# Patient Record
Sex: FEMALE | URBAN - METROPOLITAN AREA
[De-identification: names, ages, dates, MRNs, and addresses within clinical notes are randomized per-mention and may not be internally consistent; named-entity substitution may affect disease eponyms.]

---

## 2020-08-28 ENCOUNTER — APPOINTMENT (RX ONLY)
Dept: URBAN - METROPOLITAN AREA CLINIC 41 | Facility: CLINIC | Age: 40
Setting detail: DERMATOLOGY
End: 2020-08-28

## 2020-08-28 DIAGNOSIS — L81.1 CHLOASMA: ICD-10-CM

## 2020-08-28 DIAGNOSIS — D22 MELANOCYTIC NEVI: ICD-10-CM

## 2020-08-28 DIAGNOSIS — L81.2 FRECKLES: ICD-10-CM

## 2020-08-28 DIAGNOSIS — L81.4 OTHER MELANIN HYPERPIGMENTATION: ICD-10-CM

## 2020-08-28 PROBLEM — D22.61 MELANOCYTIC NEVI OF RIGHT UPPER LIMB, INCLUDING SHOULDER: Status: ACTIVE | Noted: 2020-08-28

## 2020-08-28 PROBLEM — D22.5 MELANOCYTIC NEVI OF TRUNK: Status: ACTIVE | Noted: 2020-08-28

## 2020-08-28 PROCEDURE — ? PRESCRIPTION

## 2020-08-28 PROCEDURE — 99202 OFFICE O/P NEW SF 15 MIN: CPT

## 2020-08-28 PROCEDURE — ? TREATMENT REGIMEN

## 2020-08-28 PROCEDURE — ? COUNSELING

## 2020-08-28 ASSESSMENT — LOCATION DETAILED DESCRIPTION DERM
LOCATION DETAILED: RIGHT SUPERIOR LATERAL UPPER BACK
LOCATION DETAILED: RIGHT CENTRAL MALAR CHEEK
LOCATION DETAILED: LEFT DISTAL POSTERIOR UPPER ARM
LOCATION DETAILED: LEFT PROXIMAL ULNAR DORSAL FOREARM
LOCATION DETAILED: LEFT PROXIMAL POSTERIOR UPPER ARM
LOCATION DETAILED: SUPERIOR MID FOREHEAD
LOCATION DETAILED: LEFT POSTERIOR SHOULDER
LOCATION DETAILED: RIGHT POSTERIOR SHOULDER
LOCATION DETAILED: RIGHT INFERIOR UPPER BACK
LOCATION DETAILED: LEFT SUPERIOR UPPER BACK
LOCATION DETAILED: RIGHT PROXIMAL POSTERIOR UPPER ARM
LOCATION DETAILED: RIGHT ANTERIOR PROXIMAL UPPER ARM
LOCATION DETAILED: LEFT ANTERIOR DISTAL UPPER ARM
LOCATION DETAILED: RIGHT VENTRAL PROXIMAL FOREARM
LOCATION DETAILED: LEFT CENTRAL MALAR CHEEK
LOCATION DETAILED: RIGHT PROXIMAL DORSAL FOREARM
LOCATION DETAILED: LEFT VENTRAL PROXIMAL FOREARM

## 2020-08-28 ASSESSMENT — LOCATION SIMPLE DESCRIPTION DERM
LOCATION SIMPLE: RIGHT SHOULDER
LOCATION SIMPLE: LEFT SHOULDER
LOCATION SIMPLE: RIGHT UPPER ARM
LOCATION SIMPLE: LEFT FOREARM
LOCATION SIMPLE: LEFT CHEEK
LOCATION SIMPLE: SUPERIOR FOREHEAD
LOCATION SIMPLE: LEFT POSTERIOR UPPER ARM
LOCATION SIMPLE: RIGHT BACK
LOCATION SIMPLE: LEFT UPPER BACK
LOCATION SIMPLE: RIGHT FOREARM
LOCATION SIMPLE: RIGHT POSTERIOR UPPER ARM
LOCATION SIMPLE: LEFT UPPER ARM
LOCATION SIMPLE: RIGHT UPPER BACK
LOCATION SIMPLE: RIGHT CHEEK

## 2020-08-28 ASSESSMENT — LOCATION ZONE DERM
LOCATION ZONE: ARM
LOCATION ZONE: FACE
LOCATION ZONE: TRUNK

## 2023-04-17 ENCOUNTER — OFFICE VISIT (OUTPATIENT)
Dept: ORTHOPEDIC SURGERY | Age: 43
End: 2023-04-17
Payer: COMMERCIAL

## 2023-04-17 VITALS — WEIGHT: 213 LBS | HEIGHT: 70 IN | BODY MASS INDEX: 30.49 KG/M2

## 2023-04-17 DIAGNOSIS — M47.816 LUMBAR SPONDYLOSIS: Primary | ICD-10-CM

## 2023-04-17 DIAGNOSIS — M51.36 DDD (DEGENERATIVE DISC DISEASE), LUMBAR: ICD-10-CM

## 2023-04-17 PROCEDURE — 99204 OFFICE O/P NEW MOD 45 MIN: CPT | Performed by: PHYSICIAN ASSISTANT

## 2023-04-17 NOTE — PROGRESS NOTES
L3-S1      Motor    Hip Flexion: grossly normal, but somewhat pain limited on the right    Knee Extension:  grossly normal, but somewhat pain limited on the right    Ankle Dorsiflexion: grossly normal on the left, 4+/5 on the right    Great Toe Exension:  grossly normal       Point tenderness: Diffuse tenderness across the lower lumbar spine near the belt line and in the midline. No point tenderness over the greater trochanter of the right hip. Gait: Ambulates with no appreciable limp. Good painless range of motion of the hips. IMAGING:     MRI Result (most recent):  No results found for this or any previous visit from the past 3650 days. AP and lateral views of the lumbar spine reveal good lordosis. Good alignment of the lumbar spine. Mild lumbar spondylosis and degenerative disc disease noted. Possibly some mild lower lumbar facet arthropathy at L5-S1    Interpretation: Lumbar spondylosis degenerative disc disease      ASSESSMENT AND PLAN:     The patient has low back pain with some extension into the right lateral hip. Some intermittent numbness to the right toes is present. I discussed the x-ray findings with the patient. She has mild degenerative changes noted in her lumbar spine x-rays. I discussed the fact that her pain may or may not be related to her lumbar spine. The AP view of the lumbar films seem to show good joint space and normal-appearing femoral heads of the hips, so lower suspicion of an intrinsic hip pathology here. I would recommend a course of anti-inflammatories and physical therapy normally, but given she is trying to avoid these medications and trying to get pregnant we will avoid that for now as well. I did offer a topical Voltaren in its place and recommended she discuss with her OB/GYN whether it is reasonable to take this topically while she is trying to get pregnant.   We will start a course of physical therapy for back and hip exercises and see if she is

## 2023-04-18 ENCOUNTER — TELEPHONE (OUTPATIENT)
Dept: FAMILY MEDICINE CLINIC | Facility: CLINIC | Age: 43
End: 2023-04-18

## 2023-04-18 NOTE — TELEPHONE ENCOUNTER
----- Message from John Peñaloza sent at 4/17/2023  1:38 PM EDT -----  Subject: Results Request    QUESTIONS  Results: urine culture; Ordered by: Clarissa Saeed Performed: 2023-04-14  ---------------------------------------------------------------------------  --------------  Kezia Simmons PWQK    [phone]; OK to leave message on voicemail  ---------------------------------------------------------------------------  --------------

## 2023-04-18 NOTE — TELEPHONE ENCOUNTER
Patient contacted. Let her know provider has been out of the office since Friday afternoon and will not be back until tomorrow afternoon. Let her know that message has been sent to him regarding reviewing lab results. Patient wanted to let provider and office know that she was extremely upset at how things are handled here. States \"No one should have to wait this long to have lab results reviewed and medication sent in.\" Patient reported she will not return as a patient here at this office.

## 2023-04-18 NOTE — TELEPHONE ENCOUNTER
----- Message from Lynne Garcia sent at 4/18/2023 10:22 AM EDT -----  Patient is calling back in regards to urine culture results. Culture was done last week and still has not heard back and believes she has infection she would like to get taken care of. Patient requesting callback today.

## 2023-04-19 ENCOUNTER — TELEPHONE (OUTPATIENT)
Dept: FAMILY MEDICINE CLINIC | Facility: CLINIC | Age: 43
End: 2023-04-19

## 2023-04-19 RX ORDER — SULFAMETHOXAZOLE AND TRIMETHOPRIM 400; 80 MG/1; MG/1
2 TABLET ORAL DAILY
Qty: 40 TABLET | Refills: 0 | Status: SHIPPED | OUTPATIENT
Start: 2023-04-19 | End: 2023-04-29

## 2023-04-19 NOTE — TELEPHONE ENCOUNTER
----- Message from Chantel Patel MD sent at 4/19/2023  8:40 AM EDT -----  Please call patient and report that I have sent a prescription for a urinary tract infection to her drugstore. Advise patient that her urine culture came back while I was on vacation. I am sorry for the delay, I e expected one of my partners would have been notified of this and sent in a prescription for this.   Also please advise that I am obligated to respond to any medical need that she may have for the next 30 days or until she finds a new provider, which ever comes first.

## 2023-05-01 ENCOUNTER — OFFICE VISIT (OUTPATIENT)
Dept: ORTHOPEDIC SURGERY | Age: 43
End: 2023-05-01
Payer: COMMERCIAL

## 2023-05-01 DIAGNOSIS — M77.12 LATERAL EPICONDYLITIS, LEFT ELBOW: ICD-10-CM

## 2023-05-01 DIAGNOSIS — M75.42 IMPINGEMENT SYNDROME OF LEFT SHOULDER: Primary | ICD-10-CM

## 2023-05-01 DIAGNOSIS — M25.512 LEFT SHOULDER PAIN, UNSPECIFIED CHRONICITY: ICD-10-CM

## 2023-05-01 PROCEDURE — 99204 OFFICE O/P NEW MOD 45 MIN: CPT | Performed by: ORTHOPAEDIC SURGERY
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
## 2023-05-08 ENCOUNTER — NURSE ONLY (OUTPATIENT)
Dept: FAMILY MEDICINE CLINIC | Facility: CLINIC | Age: 43
End: 2023-05-08

## 2023-05-08 DIAGNOSIS — R30.0 DYSURIA: Primary | ICD-10-CM

## 2023-05-08 DIAGNOSIS — R77.0 LOW SERUM ALBUMIN: ICD-10-CM

## 2023-05-08 DIAGNOSIS — D64.9 ANEMIA, UNSPECIFIED TYPE: ICD-10-CM

## 2023-05-08 DIAGNOSIS — R30.0 DYSURIA: ICD-10-CM

## 2023-05-09 LAB
ALBUMIN SERPL-MCNC: 3.6 G/DL (ref 3.5–5)
ALBUMIN/GLOB SERPL: 1.1 (ref 0.4–1.6)
ALP SERPL-CCNC: 80 U/L (ref 50–136)
ALT SERPL-CCNC: 21 U/L (ref 12–65)
AST SERPL-CCNC: 14 U/L (ref 15–37)
BACTERIA URNS QL MICRO: NEGATIVE /HPF
BILIRUB DIRECT SERPL-MCNC: 0.1 MG/DL
BILIRUB SERPL-MCNC: 0.5 MG/DL (ref 0.2–1.1)
CASTS URNS QL MICRO: ABNORMAL /LPF (ref 0–2)
EPI CELLS #/AREA URNS HPF: ABNORMAL /HPF (ref 0–5)
GLOBULIN SER CALC-MCNC: 3.3 G/DL (ref 2.8–4.5)
IRON SERPL-MCNC: 43 UG/DL (ref 35–150)
PROT SERPL-MCNC: 6.9 G/DL (ref 6.3–8.2)
RBC #/AREA URNS HPF: >100 /HPF (ref 0–5)
TIBC SERPL-MCNC: 390 UG/DL (ref 250–450)
WBC URNS QL MICRO: ABNORMAL /HPF (ref 0–4)

## 2023-05-11 LAB
BACTERIA SPEC CULT: NORMAL
SERVICE CMNT-IMP: NORMAL

## 2023-05-23 ENCOUNTER — OFFICE VISIT (OUTPATIENT)
Dept: OBGYN CLINIC | Age: 43
End: 2023-05-23
Payer: COMMERCIAL

## 2023-05-23 VITALS
DIASTOLIC BLOOD PRESSURE: 70 MMHG | WEIGHT: 212.6 LBS | BODY MASS INDEX: 30.43 KG/M2 | HEIGHT: 70 IN | SYSTOLIC BLOOD PRESSURE: 124 MMHG

## 2023-05-23 DIAGNOSIS — G44.89 HEADACHE ASSOCIATED WITH HORMONAL FACTORS: ICD-10-CM

## 2023-05-23 DIAGNOSIS — N97.0 ANOVULATION: Primary | ICD-10-CM

## 2023-05-23 PROBLEM — O09.519 AMA (ADVANCED MATERNAL AGE) PRIMIGRAVIDA 35+: Status: RESOLVED | Noted: 2023-05-23 | Resolved: 2023-05-23

## 2023-05-23 PROBLEM — O09.519 AMA (ADVANCED MATERNAL AGE) PRIMIGRAVIDA 35+: Status: ACTIVE | Noted: 2023-05-23

## 2023-05-23 PROCEDURE — 99204 OFFICE O/P NEW MOD 45 MIN: CPT | Performed by: OBSTETRICS & GYNECOLOGY

## 2023-05-23 RX ORDER — LANOLIN ALCOHOL/MO/W.PET/CERES
1000 CREAM (GRAM) TOPICAL DAILY
COMMUNITY

## 2023-05-28 LAB — MIS SERPL-MCNC: 1.11 NG/ML

## 2023-05-30 ENCOUNTER — TELEPHONE (OUTPATIENT)
Dept: OBGYN CLINIC | Age: 43
End: 2023-05-30

## 2023-05-30 NOTE — TELEPHONE ENCOUNTER
Called patient to discuss lab results, no answer. Left patient a message notify her I would like to review lab results and that a [employer] message was sent and pt could answer questions in [email or web address].

## 2023-05-30 NOTE — TELEPHONE ENCOUNTER
----- Message from Yousuf Archer MD sent at 5/29/2023  8:55 AM EDT -----  Regarding: FW:  Please notify that her AMH is consistent with good ovarian reserve!  ----- Message -----  From: Della Quiros Incoming [employer]  Sent: 5/28/2023   5:38 PM EDT  To: Yousuf Archer MD